# Patient Record
Sex: FEMALE | Race: BLACK OR AFRICAN AMERICAN | URBAN - METROPOLITAN AREA
[De-identification: names, ages, dates, MRNs, and addresses within clinical notes are randomized per-mention and may not be internally consistent; named-entity substitution may affect disease eponyms.]

---

## 2018-03-05 ENCOUNTER — HISTORICAL (OUTPATIENT)
Dept: LAB | Facility: HOSPITAL | Age: 63
End: 2018-03-05

## 2018-03-05 LAB
ABS NEUT (OLG): 4.58 X10(3)/MCL (ref 2.1–9.2)
ALBUMIN SERPL-MCNC: 3.5 GM/DL (ref 3.4–5)
ALBUMIN/GLOB SERPL: 0.9 RATIO (ref 1.1–2)
ALP SERPL-CCNC: 96 UNIT/L (ref 38–126)
ALT SERPL-CCNC: 32 UNIT/L (ref 12–78)
APTT PPP: 27.2 SECOND(S) (ref 24.8–36.9)
AST SERPL-CCNC: 14 UNIT/L (ref 15–37)
BASOPHILS # BLD AUTO: 0 X10(3)/MCL (ref 0–0.2)
BASOPHILS NFR BLD AUTO: 0 %
BILIRUB SERPL-MCNC: 0.5 MG/DL (ref 0.2–1)
BILIRUBIN DIRECT+TOT PNL SERPL-MCNC: 0.1 MG/DL (ref 0–0.5)
BILIRUBIN DIRECT+TOT PNL SERPL-MCNC: 0.4 MG/DL (ref 0–0.8)
BUN SERPL-MCNC: 19 MG/DL (ref 7–18)
CALCIUM SERPL-MCNC: 9.3 MG/DL (ref 8.5–10.1)
CHLORIDE SERPL-SCNC: 104 MMOL/L (ref 98–107)
CO2 SERPL-SCNC: 33 MMOL/L (ref 21–32)
CREAT SERPL-MCNC: 0.95 MG/DL (ref 0.55–1.02)
ERYTHROCYTE [DISTWIDTH] IN BLOOD BY AUTOMATED COUNT: 14.8 % (ref 11.5–17)
GLOBULIN SER-MCNC: 3.8 GM/DL (ref 2.4–3.5)
GLUCOSE SERPL-MCNC: 105 MG/DL (ref 74–106)
HCT VFR BLD AUTO: 46.4 % (ref 37–47)
HGB BLD-MCNC: 14.6 GM/DL (ref 12–16)
INR PPP: 1.05 (ref 0–1.27)
LYMPHOCYTES # BLD AUTO: 2.5 X10(3)/MCL (ref 0.6–4.6)
LYMPHOCYTES NFR BLD AUTO: 33 %
MCH RBC QN AUTO: 29.1 PG (ref 27–31)
MCHC RBC AUTO-ENTMCNC: 31.5 GM/DL (ref 33–36)
MCV RBC AUTO: 92.6 FL (ref 80–94)
MONOCYTES # BLD AUTO: 0.4 X10(3)/MCL (ref 0.1–1.3)
MONOCYTES NFR BLD AUTO: 6 %
NEUTROPHILS # BLD AUTO: 4.58 X10(3)/MCL (ref 1.4–7.9)
NEUTROPHILS NFR BLD AUTO: 61 %
PLATELET # BLD AUTO: 196 X10(3)/MCL (ref 130–400)
PMV BLD AUTO: 10.2 FL (ref 9.4–12.4)
POTASSIUM SERPL-SCNC: 4.1 MMOL/L (ref 3.5–5.1)
PROT SERPL-MCNC: 7.3 GM/DL (ref 6.4–8.2)
PROTHROMBIN TIME: 14 SECOND(S) (ref 12.2–14.7)
RBC # BLD AUTO: 5.01 X10(6)/MCL (ref 4.2–5.4)
SODIUM SERPL-SCNC: 142 MMOL/L (ref 136–145)
WBC # SPEC AUTO: 7.5 X10(3)/MCL (ref 4.5–11.5)

## 2018-03-15 ENCOUNTER — HISTORICAL (OUTPATIENT)
Dept: ADMINISTRATIVE | Facility: HOSPITAL | Age: 63
End: 2018-03-15

## 2022-04-30 NOTE — OP NOTE
DATE OF SURGERY:    03/15/2018    SURGEON:  Patrick Gamboa MD  ASSISTANT:  MARY Graham    PREOPERATIVE DIAGNOSIS:    1. Low back pain.  2. Chronic pain syndrome.  3. Lumbar radiculopathy.    POSTOPERATIVE DIAGNOSIS:    1. Low back pain.  2. Chronic pain syndrome.  3. Lumbar radiculopathy.    PROCEDURE:    1. T8-9 laminectomy for implantation of spinal cord stimulator paddle.  2. Implantation of pulse generator.  3. Operative microscopic dissection.    INDICATIONS FOR PROCEDURE:  Mr. Portia Lindsay is a 62-year-old female who presented to my clinic with complaints of back pain, bilateral lower extremity radiculopathy 10 out of 10 at worse, 7 out of 10 on average.  She underwent therapeutic injections, physical therapy as well as pain clinic treatment.  Despite this she had persistent severe pain, chronic pain.  She underwent a spinal cord stimulator trial, received greater than 90% relief of her symptomatology.  MRI of the thoracic spine noted no evidence of spinal canal stenosis.  She was referred to my clinic for consideration of implantation.  She elected to undergo implantation of spinal cord stimulator system.    PROCEDURE IN DETAIL:  After informed consent was obtained the patient was brought to the operating room, placed under general anesthesia, intubated by the anesthesia team, transferred to the operating table in the prone position, appropriately padded, prepped and draped in usual sterile fashion.     I began the procedure by making a dorsal midline incision fluoroscopically located over the T8-9 vertebral body levels.  Dissection was carried down in a subperiosteal fashion to expose the underlying spinous process lamina complex of T8 as well as the cranial most portion of T9.  Fluoroscopy confirmed the level.     The operative microscope was brought in where a large bite [place] was used to remove the spinous process of T8.  High speed pneumatic bur was used to perform a laminectomy up to  the cranial edge of the yellow ligamen.  I undermined it with a 6-0 angled curet and excised it with a Kerrison punch.  I cleared the soft tissue off the subjacent lamina on T9, attached cranial plate fixation with two silk ties attached.  I then passed the spinal stimulator paddle to the dorsal radiographic midline over T7-8 space which is where she received a point of maximal stimulation.  The electrodes were hooked up to the spinal cord stimulator computer.  Computer programming was performed.  Bilateral neurophysiologic coverage was achieved as witnessed with neuro monitoring.  The leads were then anchored to the subjacent lamina using silk ties.     I then made a right lower quadrant pocket using a 3 cm incision.  I dissected down.  Using a 3 cm incision I dissected down approximately 1 1/2 cm deep.  I used a single sweep technique to create a 3 x 3 cm pocket.  I tunneled the electrodes to this pocket, connected it to the spinal cord stimulator battery.  Impedances were appropriate x two.  I placed a relaxing loop in this pocket as well as one in the suprafascial pocket and a dorsal midline incision and the battery was then implanted.  Complete hemostasis was achieved.  Copious irrigation was used to wash out both incisions. Standard layered closure was performed.  All counts were correct x two.       Estimated blood loss:  25 ml.     Blood replaced:  None.     Specimen sent:  None.     Implants:  [employer] spinal cord stimulator system.       Apparent intraoperative complications:  None.  Neuromonitoring signals stayed stable throughout the entirety of the case.        ______________________________  MD STEW Hoang/SHANIA  DD:  03/15/2018  Time:  03:25PM  DT:  03/16/2018  Time:  11:22AM  Job #:  [id number]